# Patient Record
Sex: FEMALE | Race: WHITE | NOT HISPANIC OR LATINO | URBAN - NONMETROPOLITAN AREA
[De-identification: names, ages, dates, MRNs, and addresses within clinical notes are randomized per-mention and may not be internally consistent; named-entity substitution may affect disease eponyms.]

---

## 2017-03-21 ENCOUNTER — OFFICE (OUTPATIENT)
Dept: URBAN - NONMETROPOLITAN AREA CLINIC 13 | Facility: CLINIC | Age: 67
End: 2017-03-21
Payer: OTHER GOVERNMENT

## 2017-03-21 ENCOUNTER — OFFICE (OUTPATIENT)
Dept: URBAN - NONMETROPOLITAN AREA CLINIC 13 | Facility: CLINIC | Age: 67
End: 2017-03-21
Payer: MEDICARE

## 2017-03-21 VITALS — HEIGHT: 62 IN

## 2017-03-21 VITALS
DIASTOLIC BLOOD PRESSURE: 70 MMHG | HEART RATE: 58 BPM | WEIGHT: 148 LBS | SYSTOLIC BLOOD PRESSURE: 146 MMHG | HEIGHT: 62 IN

## 2017-03-21 DIAGNOSIS — R19.7 DIARRHEA, UNSPECIFIED: ICD-10-CM

## 2017-03-21 DIAGNOSIS — K21.9 GASTRO-ESOPHAGEAL REFLUX DISEASE WITHOUT ESOPHAGITIS: ICD-10-CM

## 2017-03-21 DIAGNOSIS — R14.0 ABDOMINAL DISTENSION (GASEOUS): ICD-10-CM

## 2017-03-21 DIAGNOSIS — Z12.11 ENCOUNTER FOR SCREENING FOR MALIGNANT NEOPLASM OF COLON: ICD-10-CM

## 2017-03-21 DIAGNOSIS — R10.11 RIGHT UPPER QUADRANT PAIN: ICD-10-CM

## 2017-03-21 DIAGNOSIS — K76.0 FATTY (CHANGE OF) LIVER, NOT ELSEWHERE CLASSIFIED: ICD-10-CM

## 2017-03-21 DIAGNOSIS — R63.5 ABNORMAL WEIGHT GAIN: ICD-10-CM

## 2017-03-21 DIAGNOSIS — R12 HEARTBURN: ICD-10-CM

## 2017-03-21 LAB
AMYLASE: 48 U/L (ref 25–125)
CBC EMERALD: HEMATOCRIT: 38 % (ref 37–47)
CBC EMERALD: HEMOGLOBIN: 13.1 G/DL (ref 12–14)
CBC EMERALD: LYMPHS %: 48.5 % — HIGH (ref 20.5–40.5)
CBC EMERALD: LYMPHS ABSOLUTE: 5.7 10*3U/L — HIGH (ref 1.3–2.9)
CBC EMERALD: MCH: 32.6 PG — HIGH (ref 27–32)
CBC EMERALD: MCHC: 34.5 G/DL (ref 32–35)
CBC EMERALD: MCV: 94.5 FL (ref 84–100)
CBC EMERALD: MONOS %: 7.1 % (ref 2–10)
CBC EMERALD: MONOS ABSOLUTE: 0.8 10*3U/L (ref 0.3–3.8)
CBC EMERALD: MPV: 8.7 FL (ref 7.4–10.4)
CBC EMERALD: NEUT. %: 44.4 % (ref 43–65)
CBC EMERALD: NEUT. ABSOLUTE: 5.2 10*3U/L — HIGH (ref 2.2–4.8)
CBC EMERALD: PLATELETS: 339 10*3U/L (ref 130–440)
CBC EMERALD: RBC: 4 10*6U/L — LOW (ref 4.2–5.4)
CBC EMERALD: RDW: 12.2 % (ref 11.5–15.5)
CBC EMERALD: WBC: 11.7 10*3U/L — HIGH (ref 4.5–10.5)
COMPLETE METABOLIC: ALBUMIN: 4 G/DL (ref 3.5–5.2)
COMPLETE METABOLIC: ALK. PHOS: 64 U/L (ref 40–150)
COMPLETE METABOLIC: ALT: 20 U/L (ref 0–55)
COMPLETE METABOLIC: AST: 17 U/L (ref 5–34)
COMPLETE METABOLIC: BUN: 20 MG/DL (ref 7–26)
COMPLETE METABOLIC: CALCIUM: 9.7 MG/DL (ref 8.4–10.2)
COMPLETE METABOLIC: CHLORIDE: 104 MMOL/L (ref 98–107)
COMPLETE METABOLIC: CO2: 28 MEQ/L (ref 22–29)
COMPLETE METABOLIC: CREATININE: 0.9 MG/DL (ref 0.6–1.3)
COMPLETE METABOLIC: GLUCOSE: 138 MG/DL — HIGH (ref 70–99)
COMPLETE METABOLIC: POTASSIUM: 4.8 MMOL/L (ref 3.5–5.3)
COMPLETE METABOLIC: SODIUM: 141 MMOL/L (ref 136–145)
COMPLETE METABOLIC: TOTAL BILIRUBIN: 0.4 MG/DL (ref 0.2–1.2)
COMPLETE METABOLIC: TOTAL PROTEIN: 6.4 G/DL (ref 6.4–8.3)
LIPASE: 30 U/L (ref 8–78)
TSH: 1.7 UIU/L (ref 0.4–4.9)

## 2017-03-21 PROCEDURE — 76700 US EXAM ABDOM COMPLETE: CPT | Mod: 26,TC

## 2017-03-21 PROCEDURE — 83690 ASSAY OF LIPASE: CPT

## 2017-03-21 PROCEDURE — 99213 OFFICE O/P EST LOW 20 MIN: CPT | Mod: 25

## 2017-03-21 PROCEDURE — 85025 COMPLETE CBC W/AUTO DIFF WBC: CPT

## 2017-03-21 PROCEDURE — 80053 COMPREHEN METABOLIC PANEL: CPT

## 2017-03-21 PROCEDURE — 82150 ASSAY OF AMYLASE: CPT

## 2017-03-21 PROCEDURE — 36415 COLL VENOUS BLD VENIPUNCTURE: CPT

## 2017-03-21 PROCEDURE — 84443 ASSAY THYROID STIM HORMONE: CPT

## 2017-03-21 PROCEDURE — 76700 US EXAM ABDOM COMPLETE: CPT | Mod: TC,26

## 2017-03-21 RX ORDER — DICYCLOMINE HYDROCHLORIDE 10 MG/1
CAPSULE ORAL
Qty: 120 | Refills: 2 | Status: COMPLETED
Start: 2017-03-21 | End: 2019-04-08

## 2017-03-24 ENCOUNTER — OFFICE (OUTPATIENT)
Dept: URBAN - NONMETROPOLITAN AREA CLINIC 13 | Facility: CLINIC | Age: 67
End: 2017-03-24
Payer: MEDICARE

## 2017-03-24 VITALS — HEIGHT: 62 IN

## 2017-03-24 DIAGNOSIS — R19.7 DIARRHEA, UNSPECIFIED: ICD-10-CM

## 2017-03-24 PROCEDURE — 82274 ASSAY TEST FOR BLOOD FECAL: CPT

## 2017-04-21 ENCOUNTER — OFFICE (OUTPATIENT)
Dept: URBAN - NONMETROPOLITAN AREA CLINIC 13 | Facility: CLINIC | Age: 67
End: 2017-04-21
Payer: OTHER GOVERNMENT

## 2017-04-21 VITALS
RESPIRATION RATE: 18 BRPM | SYSTOLIC BLOOD PRESSURE: 142 MMHG | WEIGHT: 148 LBS | HEIGHT: 62 IN | DIASTOLIC BLOOD PRESSURE: 67 MMHG | HEART RATE: 63 BPM

## 2017-04-21 DIAGNOSIS — R10.11 RIGHT UPPER QUADRANT PAIN: ICD-10-CM

## 2017-04-21 DIAGNOSIS — K76.0 FATTY (CHANGE OF) LIVER, NOT ELSEWHERE CLASSIFIED: ICD-10-CM

## 2017-04-21 DIAGNOSIS — K21.9 GASTRO-ESOPHAGEAL REFLUX DISEASE WITHOUT ESOPHAGITIS: ICD-10-CM

## 2017-04-21 DIAGNOSIS — R12 HEARTBURN: ICD-10-CM

## 2017-04-21 PROCEDURE — 99213 OFFICE O/P EST LOW 20 MIN: CPT

## 2017-06-20 ENCOUNTER — OFFICE (OUTPATIENT)
Dept: URBAN - NONMETROPOLITAN AREA CLINIC 13 | Facility: CLINIC | Age: 67
End: 2017-06-20
Payer: OTHER GOVERNMENT

## 2017-06-20 VITALS
HEART RATE: 68 BPM | SYSTOLIC BLOOD PRESSURE: 133 MMHG | WEIGHT: 152 LBS | DIASTOLIC BLOOD PRESSURE: 77 MMHG | HEIGHT: 62 IN

## 2017-06-20 DIAGNOSIS — R10.11 RIGHT UPPER QUADRANT PAIN: ICD-10-CM

## 2017-06-20 DIAGNOSIS — K21.9 GASTRO-ESOPHAGEAL REFLUX DISEASE WITHOUT ESOPHAGITIS: ICD-10-CM

## 2017-06-20 DIAGNOSIS — K76.0 FATTY (CHANGE OF) LIVER, NOT ELSEWHERE CLASSIFIED: ICD-10-CM

## 2017-06-20 DIAGNOSIS — R12 HEARTBURN: ICD-10-CM

## 2017-06-20 PROCEDURE — 99213 OFFICE O/P EST LOW 20 MIN: CPT

## 2017-06-20 RX ORDER — DICYCLOMINE HYDROCHLORIDE 10 MG/1
CAPSULE ORAL
Qty: 120 | Refills: 2 | Status: COMPLETED
Start: 2017-03-21 | End: 2019-04-08

## 2017-07-21 ENCOUNTER — OFFICE (OUTPATIENT)
Dept: URBAN - NONMETROPOLITAN AREA CLINIC 13 | Facility: CLINIC | Age: 67
End: 2017-07-21
Payer: OTHER GOVERNMENT

## 2017-07-21 ENCOUNTER — OFFICE (OUTPATIENT)
Dept: URBAN - NONMETROPOLITAN AREA CLINIC 13 | Facility: CLINIC | Age: 67
End: 2017-07-21
Payer: MEDICARE

## 2017-07-21 VITALS — HEIGHT: 62 IN | DIASTOLIC BLOOD PRESSURE: 65 MMHG | WEIGHT: 153 LBS | SYSTOLIC BLOOD PRESSURE: 110 MMHG

## 2017-07-21 VITALS — HEIGHT: 62 IN

## 2017-07-21 DIAGNOSIS — K21.9 GASTRO-ESOPHAGEAL REFLUX DISEASE WITHOUT ESOPHAGITIS: ICD-10-CM

## 2017-07-21 DIAGNOSIS — R10.11 RIGHT UPPER QUADRANT PAIN: ICD-10-CM

## 2017-07-21 DIAGNOSIS — K76.0 FATTY (CHANGE OF) LIVER, NOT ELSEWHERE CLASSIFIED: ICD-10-CM

## 2017-07-21 DIAGNOSIS — R12 HEARTBURN: ICD-10-CM

## 2017-07-21 LAB
CBC EMERALD: HEMATOCRIT: 38.5 % (ref 37–47)
CBC EMERALD: HEMOGLOBIN: 12.5 G/DL (ref 12–14)
CBC EMERALD: LYMPHS %: 54.6 % — HIGH (ref 20.5–40.5)
CBC EMERALD: LYMPHS ABSOLUTE: 6.7 10*3U/L — HIGH (ref 1.3–2.9)
CBC EMERALD: MCH: 31.6 PG (ref 27–32)
CBC EMERALD: MCHC: 32.5 G/DL (ref 28.5–35)
CBC EMERALD: MCV: 97.5 FL (ref 84–100)
CBC EMERALD: MONOS %: 8.3 % (ref 2–10)
CBC EMERALD: MONOS ABSOLUTE: 1 10*3U/L (ref 0.3–3.8)
CBC EMERALD: MPV: 9.8 FL (ref 7.4–10.4)
CBC EMERALD: NEUT. %: 37.1 % — LOW (ref 43–65)
CBC EMERALD: NEUT. ABSOLUTE: 4.6 10*3U/L (ref 2.2–4.8)
CBC EMERALD: PLATELETS: 321 10*3U/L (ref 130–440)
CBC EMERALD: RBC: 4 10*6U/L — LOW (ref 4.2–5.4)
CBC EMERALD: RDW: 12.6 % (ref 11.5–15.5)
CBC EMERALD: WBC: 12.3 10*3U/L — HIGH (ref 4.5–10.5)

## 2017-07-21 PROCEDURE — 99213 OFFICE O/P EST LOW 20 MIN: CPT

## 2017-07-21 PROCEDURE — 85025 COMPLETE CBC W/AUTO DIFF WBC: CPT

## 2017-07-24 ENCOUNTER — OFFICE (OUTPATIENT)
Dept: URBAN - NONMETROPOLITAN AREA CLINIC 13 | Facility: CLINIC | Age: 67
End: 2017-07-24
Payer: OTHER GOVERNMENT

## 2017-07-24 VITALS — HEIGHT: 62 IN

## 2017-07-24 DIAGNOSIS — R10.11 RIGHT UPPER QUADRANT PAIN: ICD-10-CM

## 2017-07-24 DIAGNOSIS — K76.0 FATTY (CHANGE OF) LIVER, NOT ELSEWHERE CLASSIFIED: ICD-10-CM

## 2017-07-24 PROCEDURE — 74177 CT ABD & PELVIS W/CONTRAST: CPT | Mod: TC

## 2017-07-24 NOTE — SERVICENOTES
Patient premedicated with 50mg Benadryl NDC#96538-836-60 Lot/Exp [id number] 05/18 and 125mg Solumedrol NDC#8945-3053-33  Lot/Exp [id number] 08/19 prior to study due to IV Contrast Allergy

## 2017-08-25 ENCOUNTER — OFFICE (OUTPATIENT)
Dept: URBAN - NONMETROPOLITAN AREA CLINIC 13 | Facility: CLINIC | Age: 67
End: 2017-08-25
Payer: MEDICARE

## 2017-08-25 ENCOUNTER — OFFICE (OUTPATIENT)
Dept: URBAN - NONMETROPOLITAN AREA CLINIC 13 | Facility: CLINIC | Age: 67
End: 2017-08-25
Payer: OTHER GOVERNMENT

## 2017-08-25 VITALS — HEIGHT: 62 IN

## 2017-08-25 VITALS
HEART RATE: 85 BPM | HEIGHT: 62 IN | DIASTOLIC BLOOD PRESSURE: 85 MMHG | SYSTOLIC BLOOD PRESSURE: 135 MMHG | WEIGHT: 153 LBS

## 2017-08-25 DIAGNOSIS — R12 HEARTBURN: ICD-10-CM

## 2017-08-25 DIAGNOSIS — R10.11 RIGHT UPPER QUADRANT PAIN: ICD-10-CM

## 2017-08-25 DIAGNOSIS — K21.9 GASTRO-ESOPHAGEAL REFLUX DISEASE WITHOUT ESOPHAGITIS: ICD-10-CM

## 2017-08-25 DIAGNOSIS — K76.0 FATTY (CHANGE OF) LIVER, NOT ELSEWHERE CLASSIFIED: ICD-10-CM

## 2017-08-25 DIAGNOSIS — R06.6 HICCOUGH: ICD-10-CM

## 2017-08-25 LAB
CBC EMERALD: HEMATOCRIT: 40.5 % (ref 37–47)
CBC EMERALD: HEMOGLOBIN: 13.2 G/DL (ref 12–14)
CBC EMERALD: LYMPHS %: 49.7 % — HIGH (ref 20.5–40.5)
CBC EMERALD: LYMPHS ABSOLUTE: 6 10*3U/L — HIGH (ref 1.3–2.9)
CBC EMERALD: MCH: 31.1 PG (ref 27–32)
CBC EMERALD: MCHC: 32.6 G/DL (ref 28.5–35)
CBC EMERALD: MCV: 95.5 FL (ref 84–100)
CBC EMERALD: MONOS %: 11 % — HIGH (ref 2–10)
CBC EMERALD: MONOS ABSOLUTE: 1.3 10*3U/L (ref 0.3–3.8)
CBC EMERALD: MPV: 9.5 FL (ref 7.4–10.4)
CBC EMERALD: NEUT. %: 39.3 % — LOW (ref 43–65)
CBC EMERALD: NEUT. ABSOLUTE: 4.7 10*3U/L (ref 2.2–4.8)
CBC EMERALD: PLATELETS: 359 10*3U/L (ref 130–440)
CBC EMERALD: RBC: 4.2 10*6U/L (ref 4.2–5.4)
CBC EMERALD: RDW: 12.3 % (ref 11.5–15.5)
CBC EMERALD: WBC: 12 10*3U/L — HIGH (ref 4.5–10.5)

## 2017-08-25 PROCEDURE — 99213 OFFICE O/P EST LOW 20 MIN: CPT

## 2017-08-25 PROCEDURE — 85025 COMPLETE CBC W/AUTO DIFF WBC: CPT

## 2017-10-02 ENCOUNTER — OFFICE (OUTPATIENT)
Dept: URBAN - NONMETROPOLITAN AREA CLINIC 13 | Facility: CLINIC | Age: 67
End: 2017-10-02
Payer: OTHER GOVERNMENT

## 2017-10-02 VITALS
WEIGHT: 155 LBS | HEIGHT: 62 IN | DIASTOLIC BLOOD PRESSURE: 86 MMHG | HEART RATE: 76 BPM | SYSTOLIC BLOOD PRESSURE: 150 MMHG

## 2017-10-02 DIAGNOSIS — R12 HEARTBURN: ICD-10-CM

## 2017-10-02 DIAGNOSIS — K76.0 FATTY (CHANGE OF) LIVER, NOT ELSEWHERE CLASSIFIED: ICD-10-CM

## 2017-10-02 DIAGNOSIS — K21.9 GASTRO-ESOPHAGEAL REFLUX DISEASE WITHOUT ESOPHAGITIS: ICD-10-CM

## 2017-10-02 DIAGNOSIS — R10.11 RIGHT UPPER QUADRANT PAIN: ICD-10-CM

## 2017-10-02 PROCEDURE — 99213 OFFICE O/P EST LOW 20 MIN: CPT

## 2019-03-25 ENCOUNTER — OFFICE (OUTPATIENT)
Dept: URBAN - NONMETROPOLITAN AREA CLINIC 13 | Facility: CLINIC | Age: 69
End: 2019-03-25
Payer: OTHER GOVERNMENT

## 2019-03-25 ENCOUNTER — OFFICE (OUTPATIENT)
Dept: URBAN - NONMETROPOLITAN AREA CLINIC 13 | Facility: CLINIC | Age: 69
End: 2019-03-25
Payer: MEDICARE

## 2019-03-25 VITALS — HEIGHT: 62 IN

## 2019-03-25 VITALS
OXYGEN SATURATION: 93 % | HEART RATE: 66 BPM | HEIGHT: 62 IN | WEIGHT: 156 LBS | DIASTOLIC BLOOD PRESSURE: 81 MMHG | SYSTOLIC BLOOD PRESSURE: 130 MMHG

## 2019-03-25 DIAGNOSIS — R10.11 RIGHT UPPER QUADRANT PAIN: ICD-10-CM

## 2019-03-25 DIAGNOSIS — R10.12 LEFT UPPER QUADRANT PAIN: ICD-10-CM

## 2019-03-25 DIAGNOSIS — R10.13 EPIGASTRIC PAIN: ICD-10-CM

## 2019-03-25 DIAGNOSIS — K21.9 GASTRO-ESOPHAGEAL REFLUX DISEASE WITHOUT ESOPHAGITIS: ICD-10-CM

## 2019-03-25 DIAGNOSIS — R06.6 HICCOUGH: ICD-10-CM

## 2019-03-25 DIAGNOSIS — R19.7 DIARRHEA, UNSPECIFIED: ICD-10-CM

## 2019-03-25 DIAGNOSIS — R19.4 CHANGE IN BOWEL HABIT: ICD-10-CM

## 2019-03-25 DIAGNOSIS — Z79.899 OTHER LONG TERM (CURRENT) DRUG THERAPY: ICD-10-CM

## 2019-03-25 DIAGNOSIS — K76.0 FATTY (CHANGE OF) LIVER, NOT ELSEWHERE CLASSIFIED: ICD-10-CM

## 2019-03-25 DIAGNOSIS — R11.0 NAUSEA: ICD-10-CM

## 2019-03-25 LAB
AMYLASE: 66.2 U/L (ref 25–125)
CBC SYSMEX: HEMATOCRIT: 40 % (ref 37–47)
CBC SYSMEX: HEMOGLOBIN: 13.6 G/DL (ref 12–14)
CBC SYSMEX: LYMPHS %: 55.2 % — HIGH (ref 20.5–40.5)
CBC SYSMEX: LYMPHS ABSOLUTE: 7.7 10*3U/L — HIGH (ref 1.3–2.9)
CBC SYSMEX: MCH: 32.6 PG — HIGH (ref 27–32)
CBC SYSMEX: MCHC: 34 G/DL (ref 28.5–35)
CBC SYSMEX: MCV: 95.9 FL (ref 84–100)
CBC SYSMEX: MONOS %: 8.5 % (ref 2–10)
CBC SYSMEX: MONOS ABSOLUTE: 1.2 10*3U/L (ref 0.3–3.8)
CBC SYSMEX: MPV: 9.8 FL (ref 8.3–11.9)
CBC SYSMEX: NEUT. %: 36.3 % — LOW (ref 43–67)
CBC SYSMEX: NEUT. ABSOLUTE: 5 10*3U/L — HIGH (ref 2.2–4.8)
CBC SYSMEX: PLATELETS: 386 10*3U/L (ref 130–440)
CBC SYSMEX: RBC: 4.2 10*6U/L (ref 4.2–5.4)
CBC SYSMEX: RDW: 13.1 % (ref 11.5–15.5)
CBC SYSMEX: WBC: 13.9 10*3U/L — HIGH (ref 4.5–10.5)
COMPLETE METABOLIC: ALBUMIN: 4.2 G/DL (ref 3.5–5.2)
COMPLETE METABOLIC: ALK. PHOS: 71 U/L (ref 40–150)
COMPLETE METABOLIC: ALT: 26 U/L (ref 0–55)
COMPLETE METABOLIC: AST: 20 U/L (ref 5–34)
COMPLETE METABOLIC: BUN: 18 MG/DL (ref 7–26)
COMPLETE METABOLIC: CALCIUM: 10.4 MG/DL — HIGH (ref 8.4–10.3)
COMPLETE METABOLIC: CHLORIDE: 100 MMOL/L (ref 98–107)
COMPLETE METABOLIC: CO2: 29 MEQ/L (ref 22–29)
COMPLETE METABOLIC: CREATININE: 0.86 MG/DL (ref 0.57–1.25)
COMPLETE METABOLIC: GFR - AFRICAN AMERICAN: 84.5 (ref 59–200)
COMPLETE METABOLIC: GFR - NON AFRICAN AMERICAN: 69.7 (ref 59–200)
COMPLETE METABOLIC: GLUCOSE: 134 MG/DL — HIGH (ref 70–99)
COMPLETE METABOLIC: POTASSIUM: 4.7 MMOL/L (ref 3.5–5.3)
COMPLETE METABOLIC: SODIUM: 139 MMOL/L (ref 136–145)
COMPLETE METABOLIC: TOTAL BILIRUBIN: 0.3 MG/DL (ref 0.2–1.2)
COMPLETE METABOLIC: TOTAL PROTEIN: 7 G/DL (ref 6.4–8.3)
LIPASE: 50 U/L (ref 8–78)

## 2019-03-25 PROCEDURE — 76700 US EXAM ABDOM COMPLETE: CPT | Mod: 26,TC

## 2019-03-25 PROCEDURE — 82150 ASSAY OF AMYLASE: CPT

## 2019-03-25 PROCEDURE — 99214 OFFICE O/P EST MOD 30 MIN: CPT | Mod: 25

## 2019-03-25 PROCEDURE — 83690 ASSAY OF LIPASE: CPT

## 2019-03-25 PROCEDURE — 80053 COMPREHEN METABOLIC PANEL: CPT

## 2019-03-25 PROCEDURE — 36415 COLL VENOUS BLD VENIPUNCTURE: CPT

## 2019-03-25 PROCEDURE — 85025 COMPLETE CBC W/AUTO DIFF WBC: CPT

## 2019-03-25 RX ORDER — BISACODYL 5 MG
TABLET, DELAYED RELEASE (ENTERIC COATED) ORAL
Qty: 8 | Refills: 0 | Status: COMPLETED
Start: 2019-03-25 | End: 2019-04-16

## 2019-03-25 RX ORDER — POLYETHYLENE GLYOCOL 3350, SODIUM CHLORIDE, SODIUM BICARBONATE AND POTASSIUM CHLORIDE 420; 11.2; 5.72; 1.48 G/4L; G/4L; G/4L; G/4L
POWDER, FOR SOLUTION NASOGASTRIC; ORAL
Qty: 1 | Refills: 0 | Status: COMPLETED
Start: 2019-03-25 | End: 2019-04-16

## 2019-03-25 RX ORDER — ONDANSETRON HYDROCHLORIDE 4 MG/1
TABLET, FILM COATED ORAL
Qty: 2 | Refills: 0 | Status: COMPLETED
Start: 2019-03-25 | End: 2019-04-16

## 2019-04-08 ENCOUNTER — AMBULATORY SURGICAL CENTER (OUTPATIENT)
Dept: URBAN - NONMETROPOLITAN AREA SURGERY 2 | Facility: SURGERY | Age: 69
End: 2019-04-08
Payer: OTHER GOVERNMENT

## 2019-04-08 ENCOUNTER — OFFICE (OUTPATIENT)
Dept: URBAN - NONMETROPOLITAN AREA CLINIC 13 | Facility: CLINIC | Age: 69
End: 2019-04-08
Payer: OTHER GOVERNMENT

## 2019-04-08 VITALS
HEART RATE: 68 BPM | OXYGEN SATURATION: 98 % | HEIGHT: 62 IN | DIASTOLIC BLOOD PRESSURE: 78 MMHG | OXYGEN SATURATION: 96 % | OXYGEN SATURATION: 92 % | SYSTOLIC BLOOD PRESSURE: 157 MMHG | HEART RATE: 69 BPM | HEART RATE: 74 BPM | HEART RATE: 76 BPM | WEIGHT: 156 LBS | OXYGEN SATURATION: 95 % | DIASTOLIC BLOOD PRESSURE: 85 MMHG | DIASTOLIC BLOOD PRESSURE: 107 MMHG | RESPIRATION RATE: 20 BRPM | HEART RATE: 87 BPM | SYSTOLIC BLOOD PRESSURE: 128 MMHG | DIASTOLIC BLOOD PRESSURE: 74 MMHG | SYSTOLIC BLOOD PRESSURE: 167 MMHG | SYSTOLIC BLOOD PRESSURE: 142 MMHG | SYSTOLIC BLOOD PRESSURE: 144 MMHG | DIASTOLIC BLOOD PRESSURE: 76 MMHG | DIASTOLIC BLOOD PRESSURE: 83 MMHG | HEART RATE: 70 BPM | RESPIRATION RATE: 18 BRPM | SYSTOLIC BLOOD PRESSURE: 112 MMHG

## 2019-04-08 DIAGNOSIS — K29.70 GASTRITIS, UNSPECIFIED, WITHOUT BLEEDING: ICD-10-CM

## 2019-04-08 DIAGNOSIS — R11.2 NAUSEA WITH VOMITING, UNSPECIFIED: ICD-10-CM

## 2019-04-08 DIAGNOSIS — K31.9 DISEASE OF STOMACH AND DUODENUM, UNSPECIFIED: ICD-10-CM

## 2019-04-08 DIAGNOSIS — K30 FUNCTIONAL DYSPEPSIA: ICD-10-CM

## 2019-04-08 DIAGNOSIS — K21.9 GASTRO-ESOPHAGEAL REFLUX DISEASE WITHOUT ESOPHAGITIS: ICD-10-CM

## 2019-04-08 PROCEDURE — 43239 EGD BIOPSY SINGLE/MULTIPLE: CPT | Performed by: INTERNAL MEDICINE

## 2019-04-08 PROCEDURE — 88305 TISSUE EXAM BY PATHOLOGIST: CPT | Mod: TC | Performed by: INTERNAL MEDICINE

## 2019-04-08 PROCEDURE — 00731 ANES UPR GI NDSC PX NOS: CPT | Mod: QS,QZ

## 2019-04-08 PROCEDURE — G8907 PT DOC NO EVENTS ON DISCHARG: HCPCS | Performed by: INTERNAL MEDICINE

## 2019-04-08 PROCEDURE — G9654 MON ANESTH CARE: HCPCS

## 2019-04-08 RX ORDER — RANITIDINE HYDROCHLORIDE 300 MG/1
TABLET, FILM COATED ORAL
Qty: 180 | Refills: 1 | Status: COMPLETED
Start: 2019-04-08 | End: 2019-12-18

## 2019-04-08 RX ORDER — PANTOPRAZOLE SODIUM 40 MG/1
TABLET, DELAYED RELEASE ORAL
Qty: 90 | Refills: 1 | Status: ACTIVE
Start: 2019-04-08

## 2019-04-08 RX ORDER — ESOMEPRAZOLE MAGNESIUM 20 MG/1
CAPSULE, DELAYED RELEASE ORAL
Qty: 90 | Refills: 0 | Status: COMPLETED
End: 2019-04-08

## 2019-04-08 RX ORDER — DICYCLOMINE HYDROCHLORIDE 10 MG/1
CAPSULE ORAL
Qty: 120 | Refills: 2 | Status: COMPLETED
Start: 2017-03-21 | End: 2019-04-08

## 2019-04-11 LAB — SURGICAL PROCEDURE: PDFREPORT1: (no result)

## 2019-04-16 ENCOUNTER — AMBULATORY SURGICAL CENTER (OUTPATIENT)
Dept: URBAN - NONMETROPOLITAN AREA SURGERY 2 | Facility: SURGERY | Age: 69
End: 2019-04-16
Payer: OTHER GOVERNMENT

## 2019-04-16 VITALS
DIASTOLIC BLOOD PRESSURE: 72 MMHG | OXYGEN SATURATION: 99 % | SYSTOLIC BLOOD PRESSURE: 156 MMHG | RESPIRATION RATE: 18 BRPM | HEART RATE: 61 BPM | HEART RATE: 74 BPM | HEART RATE: 63 BPM | HEART RATE: 65 BPM | DIASTOLIC BLOOD PRESSURE: 83 MMHG | SYSTOLIC BLOOD PRESSURE: 122 MMHG | DIASTOLIC BLOOD PRESSURE: 68 MMHG | DIASTOLIC BLOOD PRESSURE: 64 MMHG | SYSTOLIC BLOOD PRESSURE: 113 MMHG | OXYGEN SATURATION: 100 % | SYSTOLIC BLOOD PRESSURE: 134 MMHG | OXYGEN SATURATION: 98 % | SYSTOLIC BLOOD PRESSURE: 136 MMHG | OXYGEN SATURATION: 97 % | SYSTOLIC BLOOD PRESSURE: 168 MMHG | DIASTOLIC BLOOD PRESSURE: 86 MMHG | HEIGHT: 62 IN | HEART RATE: 75 BPM | DIASTOLIC BLOOD PRESSURE: 76 MMHG | RESPIRATION RATE: 17 BRPM | HEART RATE: 78 BPM | WEIGHT: 156 LBS | SYSTOLIC BLOOD PRESSURE: 125 MMHG

## 2019-04-16 DIAGNOSIS — R19.4 CHANGE IN BOWEL HABIT: ICD-10-CM

## 2019-04-16 DIAGNOSIS — R19.7 DIARRHEA, UNSPECIFIED: ICD-10-CM

## 2019-04-16 DIAGNOSIS — R15.2 FECAL URGENCY: ICD-10-CM

## 2019-04-16 PROCEDURE — G8907 PT DOC NO EVENTS ON DISCHARG: HCPCS | Performed by: INTERNAL MEDICINE

## 2019-04-16 PROCEDURE — 45378 DIAGNOSTIC COLONOSCOPY: CPT | Performed by: INTERNAL MEDICINE

## 2019-04-16 PROCEDURE — G9654 MON ANESTH CARE: HCPCS

## 2019-04-16 RX ORDER — DICYCLOMINE HYDROCHLORIDE 10 MG/1
CAPSULE ORAL
Qty: 270 | Refills: 1 | Status: ACTIVE
Start: 2019-04-16

## 2019-04-16 RX ORDER — WHEAT DEXTRIN 3 G/3.8 G
POWDER (GRAM) ORAL
Qty: 2 | Refills: 5 | Status: ACTIVE
Start: 2019-04-16

## 2019-06-17 ENCOUNTER — OFFICE (OUTPATIENT)
Dept: URBAN - NONMETROPOLITAN AREA CLINIC 13 | Facility: CLINIC | Age: 69
End: 2019-06-17
Payer: OTHER GOVERNMENT

## 2019-06-17 VITALS
HEART RATE: 67 BPM | HEIGHT: 62 IN | DIASTOLIC BLOOD PRESSURE: 66 MMHG | WEIGHT: 158 LBS | SYSTOLIC BLOOD PRESSURE: 119 MMHG | OXYGEN SATURATION: 94 %

## 2019-06-17 DIAGNOSIS — K58.9 IRRITABLE BOWEL SYNDROME WITHOUT DIARRHEA: ICD-10-CM

## 2019-06-17 DIAGNOSIS — K76.0 FATTY (CHANGE OF) LIVER, NOT ELSEWHERE CLASSIFIED: ICD-10-CM

## 2019-06-17 DIAGNOSIS — K21.9 GASTRO-ESOPHAGEAL REFLUX DISEASE WITHOUT ESOPHAGITIS: ICD-10-CM

## 2019-06-17 DIAGNOSIS — R11.0 NAUSEA: ICD-10-CM

## 2019-06-17 PROCEDURE — 99214 OFFICE O/P EST MOD 30 MIN: CPT

## 2019-12-18 ENCOUNTER — OFFICE (OUTPATIENT)
Dept: URBAN - NONMETROPOLITAN AREA CLINIC 13 | Facility: CLINIC | Age: 69
End: 2019-12-18
Payer: OTHER GOVERNMENT

## 2019-12-18 VITALS
WEIGHT: 168 LBS | HEART RATE: 67 BPM | SYSTOLIC BLOOD PRESSURE: 115 MMHG | OXYGEN SATURATION: 94 % | HEIGHT: 62 IN | DIASTOLIC BLOOD PRESSURE: 72 MMHG

## 2019-12-18 DIAGNOSIS — R14.0 ABDOMINAL DISTENSION (GASEOUS): ICD-10-CM

## 2019-12-18 DIAGNOSIS — K21.9 GASTRO-ESOPHAGEAL REFLUX DISEASE WITHOUT ESOPHAGITIS: ICD-10-CM

## 2019-12-18 DIAGNOSIS — K76.0 FATTY (CHANGE OF) LIVER, NOT ELSEWHERE CLASSIFIED: ICD-10-CM

## 2019-12-18 DIAGNOSIS — J30.2 OTHER SEASONAL ALLERGIC RHINITIS: ICD-10-CM

## 2019-12-18 DIAGNOSIS — K58.9 IRRITABLE BOWEL SYNDROME WITHOUT DIARRHEA: ICD-10-CM

## 2019-12-18 DIAGNOSIS — R11.0 NAUSEA: ICD-10-CM

## 2019-12-18 PROCEDURE — 99213 OFFICE O/P EST LOW 20 MIN: CPT | Performed by: NURSE PRACTITIONER

## 2019-12-18 RX ORDER — PANTOPRAZOLE SODIUM 40 MG/1
TABLET, DELAYED RELEASE ORAL
Qty: 90 | Refills: 1 | Status: ACTIVE
Start: 2019-04-08

## 2019-12-18 RX ORDER — VITAMIN E 268 MG
CAPSULE ORAL
Qty: 60 | Refills: 5 | Status: ACTIVE

## 2019-12-18 RX ORDER — MONTELUKAST SODIUM 10 MG/1
TABLET, FILM COATED ORAL
Qty: 90 | Refills: 1 | Status: ACTIVE
Start: 2019-12-18

## 2019-12-18 RX ORDER — FAMOTIDINE 40 MG/1
TABLET, FILM COATED ORAL
Qty: 90 | Refills: 1 | Status: ACTIVE
Start: 2019-12-18

## 2019-12-18 RX ORDER — WHEAT DEXTRIN 3 G/3.8 G
POWDER (GRAM) ORAL
Qty: 2 | Refills: 5 | Status: ACTIVE
Start: 2019-04-16

## 2019-12-18 RX ORDER — DICYCLOMINE HYDROCHLORIDE 10 MG/1
CAPSULE ORAL
Qty: 270 | Refills: 1 | Status: ACTIVE
Start: 2019-04-16